# Patient Record
Sex: FEMALE
[De-identification: names, ages, dates, MRNs, and addresses within clinical notes are randomized per-mention and may not be internally consistent; named-entity substitution may affect disease eponyms.]

---

## 2022-11-03 ENCOUNTER — NON-APPOINTMENT (OUTPATIENT)
Age: 47
End: 2022-11-03

## 2022-12-12 PROBLEM — Z00.00 ENCOUNTER FOR PREVENTIVE HEALTH EXAMINATION: Status: ACTIVE | Noted: 2022-12-12

## 2022-12-14 ENCOUNTER — APPOINTMENT (OUTPATIENT)
Dept: ORTHOPEDIC SURGERY | Facility: CLINIC | Age: 47
End: 2022-12-14

## 2022-12-14 VITALS — HEIGHT: 63 IN | BODY MASS INDEX: 29.23 KG/M2 | WEIGHT: 165 LBS

## 2022-12-14 DIAGNOSIS — M16.12 UNILATERAL PRIMARY OSTEOARTHRITIS, LEFT HIP: ICD-10-CM

## 2022-12-14 PROCEDURE — 99203 OFFICE O/P NEW LOW 30 MIN: CPT

## 2022-12-14 NOTE — HISTORY OF PRESENT ILLNESS
[de-identified] : Patient present for evaluation on LT hip pain. States that this has been ongoing on for a while, can't remember exactly but several years. Patient states that she had x-rays and was told she has osteoarthrosis. Patient is taking Advil/Tylenol as needed. Patient presents limping. Patient states she pain is radiating to her lower back.  Denies any groin pain.

## 2022-12-14 NOTE — PHYSICAL EXAM
[NL (120)] : flexion 120 degrees [4___] : flexion 4[unfilled]/5 [2+] : posterior tibialis pulse: 2+ [] : patient ambulates without assistive device [Left] : left hip with pelvis [AP] : anteroposterior [Lateral] : lateral [Severe arthritis (Tonnis Grade 3)] : Severe arthritis (Tonnis Grade 3)

## 2022-12-14 NOTE — DISCUSSION/SUMMARY
[de-identified] : I reviewed patient's radiographs (advanced arthritis from 2020 images) and discussed her condition and treatment options.  Defer surgery.  Plan to start PT and HEP as well as NSAIDs.  Follow up in 6 weeks.  Patient voiced understanding and agreement with the plan.\par

## 2023-04-12 ENCOUNTER — APPOINTMENT (OUTPATIENT)
Dept: ORTHOPEDIC SURGERY | Facility: CLINIC | Age: 48
End: 2023-04-12

## 2024-04-11 ENCOUNTER — APPOINTMENT (OUTPATIENT)
Dept: CARDIOLOGY | Facility: CLINIC | Age: 49
End: 2024-04-11
Payer: COMMERCIAL

## 2024-04-11 ENCOUNTER — NON-APPOINTMENT (OUTPATIENT)
Age: 49
End: 2024-04-11

## 2024-04-11 VITALS
HEART RATE: 84 BPM | DIASTOLIC BLOOD PRESSURE: 78 MMHG | RESPIRATION RATE: 14 BRPM | OXYGEN SATURATION: 99 % | WEIGHT: 175 LBS | HEIGHT: 63 IN | BODY MASS INDEX: 31.01 KG/M2 | SYSTOLIC BLOOD PRESSURE: 148 MMHG

## 2024-04-11 VITALS — SYSTOLIC BLOOD PRESSURE: 144 MMHG | DIASTOLIC BLOOD PRESSURE: 72 MMHG

## 2024-04-11 DIAGNOSIS — Z82.49 FAMILY HISTORY OF ISCHEMIC HEART DISEASE AND OTHER DISEASES OF THE CIRCULATORY SYSTEM: ICD-10-CM

## 2024-04-11 DIAGNOSIS — Z01.818 ENCOUNTER FOR OTHER PREPROCEDURAL EXAMINATION: ICD-10-CM

## 2024-04-11 DIAGNOSIS — Z87.891 PERSONAL HISTORY OF NICOTINE DEPENDENCE: ICD-10-CM

## 2024-04-11 DIAGNOSIS — Z78.9 OTHER SPECIFIED HEALTH STATUS: ICD-10-CM

## 2024-04-11 DIAGNOSIS — R01.1 CARDIAC MURMUR, UNSPECIFIED: ICD-10-CM

## 2024-04-11 DIAGNOSIS — E66.9 OBESITY, UNSPECIFIED: ICD-10-CM

## 2024-04-11 DIAGNOSIS — R03.0 ELEVATED BLOOD-PRESSURE READING, W/OUT DIAGNOSIS OF HYPERTENSION: ICD-10-CM

## 2024-04-11 PROCEDURE — 99204 OFFICE O/P NEW MOD 45 MIN: CPT

## 2024-04-11 PROCEDURE — 93000 ELECTROCARDIOGRAM COMPLETE: CPT | Mod: NC

## 2024-04-11 RX ORDER — IBUPROFEN 600 MG/1
600 TABLET, FILM COATED ORAL 3 TIMES DAILY
Refills: 0 | Status: ACTIVE | COMMUNITY

## 2024-04-11 RX ORDER — MELOXICAM 7.5 MG/1
7.5 TABLET ORAL
Qty: 30 | Refills: 1 | Status: DISCONTINUED | COMMUNITY
Start: 2022-12-14 | End: 2024-04-11

## 2024-05-02 ENCOUNTER — APPOINTMENT (OUTPATIENT)
Dept: CARDIOLOGY | Facility: CLINIC | Age: 49
End: 2024-05-02
Payer: COMMERCIAL

## 2024-05-02 PROCEDURE — 93306 TTE W/DOPPLER COMPLETE: CPT

## 2024-05-02 NOTE — ADDENDUM
[FreeTextEntry1] : Echocardiogram showed preserved EF.  No significant wall motion abnormality. Please follow recommendation for preoperative cardiac assessment as noted above. Overall stable from cardiac point of view.

## 2024-05-02 NOTE — DISCUSSION/SUMMARY
[EKG obtained to assist in diagnosis and management of assessed problem(s)] : EKG obtained to assist in diagnosis and management of assessed problem(s) [FreeTextEntry1] : 49-year-old female with above medical history active medical problems as noted below 1.  Preoperative cardiac assessment prior to bilateral hip surgery. At present, there are no active cardiac conditions. No recent unstable coronary syndrome, decompensated heart failure, severe valvular heart disease or significant dysrhythmias. The clinical benefit of the proposed procedure outweighs the associated cardiovascular risk. Risk not attenuated with further cardiovascular testing. Prior testing as outlined above. Optimized from a cardiovascular perspective. Control blood pressure, heart rate, pulse oximetry perioperatively. If required appropriate intravenous medication for the outpatient oral medication for blood pressure, heart rate control. DVT prophylaxis as per indication. #2 longstanding history of cardiac murmur.  Recommended echocardiogram for LV RV function valvular evaluation pulmonary artery systolic pressure evaluation specifically in presence of her obesity, nonspecific borderline abnormality of the EKG with left atrial enlargement, This will help in overall cardiovascular evaluation prior to going for moderate risk surgery. #3 elevated blood pressure.  No history of hypertension.  Never been told she has elevated blood pressure.  Recommended lifestyle modifications.  Increasing activity post hip replacement.  Weight reduction.  Follow blood pressure at home.  Long-term goal reviewed to be less than 130/80. Echocardiogram will be helpful also in evaluation of LV wall thickness.  If there is evidence of hypertensive heart related changes may need to consider long-term antihypertensive therapy if lifestyle modification does not improve elevated blood pressure. 4.  Obesity.  Status post gastric banding procedure.  Continue to follow with lifestyle modification and weight reduction.  Counseling regarding low saturated fat,salt and carbohydrate intake was reviewed. Active lifestyle and regular exercise  along with weight management is advised. I have reviewed above at length. I answered all the questions. Patient verbalized understandings. Thank you very much for allowing me to participate in your patient's care. Please feel free to call me for any questions. Sincerely,  Eugenia Esquivel MD, [facility], [place]

## 2024-05-02 NOTE — PHYSICAL EXAM
[Obese] : obese [Normal Venous Pressure] : normal venous pressure [No Carotid Bruit] : no carotid bruit [Normal S1, S2] : normal S1, S2 [No Rub] : no rub [No Gallop] : no gallop [Murmur] : murmur [Clear Lung Fields] : clear lung fields [No Edema] : no edema [No Cyanosis] : no cyanosis [No Clubbing] : no clubbing [Normal Radial B/L] : normal radial B/L [Normal DP B/L] : normal DP B/L [Normal Speech] : normal speech [Alert and Oriented] : alert and oriented [de-identified] : Midsystolic murmur at 1-2/6 at the base.  No radiation.

## 2024-05-02 NOTE — REVIEW OF SYSTEMS
[Joint Pain] : joint pain [Joint Stiffness] : joint stiffness [Negative] : Heme/Lymph [FreeTextEntry5] : As per HPI

## 2024-05-02 NOTE — REASON FOR VISIT
[Other: ____] : [unfilled] [FreeTextEntry3] : Dr. Diaz [FreeTextEntry1] : 49-year-old white female is referred to me for preoperative cardiac assessment prior to bilateral hip surgery in presence of history of Obesity with gastric banding procedure many years ago Cardiac murmur Osteoarthritis.  She did not have a significant problem from anesthesia during the prior surgery. Her activity level is limited because of osteoarthritis of the hip.  But denies any significant chest pain.  She has no PND orthopnea or pedal edema.  She has no prior history of hypertension, diabetes mellitus, myocardial infarction, coronary artery disease, CHF, peripheral artery disease, rheumatic fever, thyroid or Lyme disease. She is former smoker though quit when she was 27.

## 2024-12-17 ENCOUNTER — NON-APPOINTMENT (OUTPATIENT)
Age: 49
End: 2024-12-17

## 2024-12-17 ENCOUNTER — APPOINTMENT (OUTPATIENT)
Dept: CARDIOLOGY | Facility: CLINIC | Age: 49
End: 2024-12-17
Payer: COMMERCIAL

## 2024-12-17 VITALS
WEIGHT: 176 LBS | OXYGEN SATURATION: 99 % | BODY MASS INDEX: 31.18 KG/M2 | SYSTOLIC BLOOD PRESSURE: 124 MMHG | DIASTOLIC BLOOD PRESSURE: 62 MMHG | HEART RATE: 72 BPM | HEIGHT: 63 IN

## 2024-12-17 DIAGNOSIS — Z01.818 ENCOUNTER FOR OTHER PREPROCEDURAL EXAMINATION: ICD-10-CM

## 2024-12-17 DIAGNOSIS — M16.12 UNILATERAL PRIMARY OSTEOARTHRITIS, LEFT HIP: ICD-10-CM

## 2024-12-17 PROCEDURE — 99213 OFFICE O/P EST LOW 20 MIN: CPT

## 2024-12-17 PROCEDURE — 93000 ELECTROCARDIOGRAM COMPLETE: CPT | Mod: NC
